# Patient Record
Sex: MALE | Race: WHITE
[De-identification: names, ages, dates, MRNs, and addresses within clinical notes are randomized per-mention and may not be internally consistent; named-entity substitution may affect disease eponyms.]

---

## 2018-03-02 ENCOUNTER — HOSPITAL ENCOUNTER (EMERGENCY)
Dept: HOSPITAL 45 - C.EDB | Age: 73
Discharge: HOME | End: 2018-03-02
Payer: COMMERCIAL

## 2018-03-02 VITALS
WEIGHT: 173.5 LBS | BODY MASS INDEX: 23.5 KG/M2 | WEIGHT: 173.5 LBS | HEIGHT: 72.01 IN | HEIGHT: 72.01 IN | BODY MASS INDEX: 23.5 KG/M2

## 2018-03-02 VITALS
SYSTOLIC BLOOD PRESSURE: 154 MMHG | OXYGEN SATURATION: 99 % | DIASTOLIC BLOOD PRESSURE: 73 MMHG | HEART RATE: 55 BPM | TEMPERATURE: 97.7 F

## 2018-03-02 DIAGNOSIS — Z82.49: ICD-10-CM

## 2018-03-02 DIAGNOSIS — Z79.82: ICD-10-CM
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
## 2018-03-02 DIAGNOSIS — Z79.52: ICD-10-CM

## 2018-03-02 DIAGNOSIS — I10: ICD-10-CM

## 2018-03-02 DIAGNOSIS — M54.30: ICD-10-CM

## 2018-03-02 DIAGNOSIS — S20.162A: Primary | ICD-10-CM

## 2018-03-02 DIAGNOSIS — Z80.9: ICD-10-CM

## 2018-03-02 DIAGNOSIS — F17.210: ICD-10-CM

## 2018-03-02 DIAGNOSIS — Z79.02: ICD-10-CM
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
## 2018-03-02 DIAGNOSIS — I25.10: ICD-10-CM

## 2018-03-02 DIAGNOSIS — Z79.899: ICD-10-CM

## 2018-03-02 NOTE — EMERGENCY ROOM VISIT NOTE
History


First contact with patient:  10:34


Chief Complaint:  BITE


Stated Complaint:  TICK BITE





History of Present Illness


The patient is a 73 year old male who presents to the Emergency Room with 

complaints of a tick bite to his left breast.  The patient reports that he was 

out cutting firewood 2 days ago.  He noticed the tick this morning when 

showering.  He believes that the tick has been attached for some time between 36

-48 hours.  The patient was able to remove the tick this morning, but does not 

know if he removed the entire tick.  He did bring the tick with him.  He 

reports mild bleeding from the site.  He rates his discomfort a 2 out of 10.  

Tetanus immunization is up-to-date.





Review of Systems


10 system review was performed and was negative except for pertinent positives 

and negatives as indicated in history of present illness





Past Medical/Surgical History


Medical Problems:


(1) Carotid Artery Occlusion W O Cerebral Infarction


(2) FRACTURE ACETABULUM-CLOS


(3) FX CLAVICLE NOS-CLOSED


(4) HERPES ZOSTER NOS


(5) HISTORY OF TOBACCO USE


(6) HYPERTENSION NOS


(7) Sciatica


Surgical Problems:


(1) H/O carotid endarterectomy


(2) History of heart artery stent








Family History





FH: cancer


FHx: heart disease





Social History


Smoking Status:  Current Every Day Smoker


Alcohol Use:  occasionally


Drug Use:  none


Marital Status:  


Occupation Status:  employed





Current/Historical Medications


Scheduled


Aspirin (Aspirin Chewable), 81 MG PO DAILY


Atorvastatin (Lipitor), 80 MG PO QAM


Cholecalciferol (Vitamin D), 2,000 INTER.UNIT PO DAILY


Clopidogrel Bisulfate (Plavix), 75 MG PO QAM


Fish Oil (Omega-3), 1 CAP PO TID


Metoprolol Tartrate (Lopressor) (Lopressor), 12.5 MG PO BID


Multiple Vitamin (Multivitamin), 1 TAB PO DAILY


Nitroglycerin (Nitrostat), 0.4 MG UT PRN


Prednisone Tab (Prednisone), 10 MG PO UD





Scheduled PRN


Magnesium Oxide (Mg Supplement (Magnesium), 500 MG PO DAILY PRN for MUSCLE 

CRAMPS





Physical Exam


Vital Signs











  Date Time  Temp Pulse Resp B/P (MAP) Pulse Ox O2 Delivery O2 Flow Rate FiO2


 


3/2/18 10:26 36.5 55 18 154/73 99 Room Air  











Physical Exam


CONSTITUTIONAL:  Healthy and well nourished.  Alert and oriented X 3 with 

positive affect.


HEENT:  Normocephalic, atraumatic.  Pupils equal, round and reactive.  


NECK:  Full active range of motion without discomfort.


INTEGUMENTARY: Examination of the left inferomedial breast shows a mild scab 

formation from where the tick was removed.  There is no peripheral induration.  

A venous lake is visualized.  There is no significant tenderness to palpation.


NEUROLOGIC:  No focal neurologic deficits noted.





Medical Decision & Procedures


ED Course


Patient history and physical exam were performed.  Nurse's notes were reviewed.

  Vital signs were reviewed and were normal.  Visualization of the tick 

confirms that it is a deer tick.  The patient will be administered a 

prophylactic dose of doxycycline 200 mg orally.  A bacitracin dressing was 

applied.  The patient was instructed to watch for any rash consistent with 

erythema migrans.  He was instructed to follow-up with his PCP if this 

develops.  The patient was also encouraged to watch for any signs of local 

infection of the breast.  The patient was happy with plan of care, voiced 

understanding of all discharge instructions, and denied any pain at the time of 

discharge.





The patient was also seen and examined by Dr. Pérez, ED attending 

physician who agrees with workup and plan of care.





Medical Decision








Medication Reconcilliation


Current Medication List:  was personally reviewed by me





Blood Pressure Screening


Patient's blood pressure:  Normal blood pressure





Impression





 Primary Impression:  


 Tick bite of left male breast





Departure Information


Dispostion


Home / Self-Care





Forms


HOME CARE DOCUMENTATION FORM,                                                 

               IMPORTANT VISIT INFORMATION





Patient Instructions


My [facility] [employer]





Additional Instructions





You have been treated with a one-time dose of doxycycline which should 

significantly reduce your risk for Lyme's disease.  Watch for any developing 

bull's-eye rash on the body over the next month.  If this does develop, follow 

up with your family doctor for further treatment.





Keep the wound covered with a bandage and antibiotic ointment until it heals.





Problem Qualifiers








 Primary Impression:  


 Tick bite of left male breast


 Encounter type:  initial encounter  Qualified Codes:  S20.162A - Insect bite (

nonvenomous) of breast, left breast, initial encounter; W57.XXXA - Bitten or 

stung by nonvenomous insect and other nonvenomous arthropods, initial encounter

## 2018-03-02 NOTE — EMERGENCY ROOM VISIT NOTE
ED Visit Note


First contact with patient:  10:34


The patient was seen and examined with Bernardo Petit PA-C.  I agree with the 

history, physical and findings.  Please see the note for disposition and 

details.